# Patient Record
Sex: MALE | Race: WHITE | NOT HISPANIC OR LATINO | URBAN - METROPOLITAN AREA
[De-identification: names, ages, dates, MRNs, and addresses within clinical notes are randomized per-mention and may not be internally consistent; named-entity substitution may affect disease eponyms.]

---

## 2017-11-17 ENCOUNTER — IMPORTED ENCOUNTER (OUTPATIENT)
Dept: URBAN - METROPOLITAN AREA CLINIC 50 | Facility: CLINIC | Age: 72
End: 2017-11-17

## 2017-11-27 ENCOUNTER — IMPORTED ENCOUNTER (OUTPATIENT)
Dept: URBAN - METROPOLITAN AREA CLINIC 50 | Facility: CLINIC | Age: 72
End: 2017-11-27

## 2018-12-03 ENCOUNTER — IMPORTED ENCOUNTER (OUTPATIENT)
Dept: URBAN - METROPOLITAN AREA CLINIC 50 | Facility: CLINIC | Age: 73
End: 2018-12-03

## 2018-12-27 ENCOUNTER — IMPORTED ENCOUNTER (OUTPATIENT)
Dept: URBAN - METROPOLITAN AREA CLINIC 50 | Facility: CLINIC | Age: 73
End: 2018-12-27

## 2018-12-28 ENCOUNTER — IMPORTED ENCOUNTER (OUTPATIENT)
Dept: URBAN - METROPOLITAN AREA CLINIC 50 | Facility: CLINIC | Age: 73
End: 2018-12-28

## 2019-01-02 ENCOUNTER — IMPORTED ENCOUNTER (OUTPATIENT)
Dept: URBAN - METROPOLITAN AREA CLINIC 50 | Facility: CLINIC | Age: 74
End: 2019-01-02

## 2019-01-11 ENCOUNTER — IMPORTED ENCOUNTER (OUTPATIENT)
Dept: URBAN - METROPOLITAN AREA CLINIC 50 | Facility: CLINIC | Age: 74
End: 2019-01-11

## 2019-01-23 ENCOUNTER — IMPORTED ENCOUNTER (OUTPATIENT)
Dept: URBAN - METROPOLITAN AREA CLINIC 50 | Facility: CLINIC | Age: 74
End: 2019-01-23

## 2019-01-23 NOTE — PATIENT DISCUSSION
"""S/P IOL OS: Jaswinder [id number] 21.5 @ 150Âº +Femto +TM/Omidria. Continue post operative instructions and drops per schedule.  """

## 2019-02-01 ENCOUNTER — IMPORTED ENCOUNTER (OUTPATIENT)
Dept: URBAN - METROPOLITAN AREA CLINIC 50 | Facility: CLINIC | Age: 74
End: 2019-02-01

## 2019-02-01 NOTE — PATIENT DISCUSSION
"""S/P IOL OS: Jaswinder [id number] 21.5 @ 150Âº +Femto +TM/Omidria. Continue post operative instructions and drops per schedule.  """

## 2019-02-21 ENCOUNTER — IMPORTED ENCOUNTER (OUTPATIENT)
Dept: URBAN - METROPOLITAN AREA CLINIC 50 | Facility: CLINIC | Age: 74
End: 2019-02-21

## 2019-02-25 ENCOUNTER — IMPORTED ENCOUNTER (OUTPATIENT)
Dept: URBAN - METROPOLITAN AREA CLINIC 50 | Facility: CLINIC | Age: 74
End: 2019-02-25

## 2019-02-25 NOTE — PATIENT DISCUSSION
"""S/P IOL OU: OD: Jaswinder [id number] 21.5 @ 25Âº +ORAFemto +TMOmidria. OS: Jaswinder [id number] 21.5 @ 150Âº +TM. MRx given today. "

## 2019-06-25 ENCOUNTER — APPOINTMENT (RX ONLY)
Dept: URBAN - METROPOLITAN AREA CLINIC 56 | Facility: CLINIC | Age: 74
Setting detail: DERMATOLOGY
End: 2019-06-25

## 2019-06-25 DIAGNOSIS — L81.4 OTHER MELANIN HYPERPIGMENTATION: ICD-10-CM

## 2019-06-25 DIAGNOSIS — M27.0 DEVELOPMENTAL DISORDERS OF JAWS: ICD-10-CM

## 2019-06-25 DIAGNOSIS — D22 MELANOCYTIC NEVI: ICD-10-CM

## 2019-06-25 DIAGNOSIS — D18.0 HEMANGIOMA: ICD-10-CM

## 2019-06-25 DIAGNOSIS — L82.1 OTHER SEBORRHEIC KERATOSIS: ICD-10-CM

## 2019-06-25 DIAGNOSIS — L90.5 SCAR CONDITIONS AND FIBROSIS OF SKIN: ICD-10-CM

## 2019-06-25 DIAGNOSIS — L21.8 OTHER SEBORRHEIC DERMATITIS: ICD-10-CM

## 2019-06-25 PROBLEM — E78.5 HYPERLIPIDEMIA, UNSPECIFIED: Status: ACTIVE | Noted: 2019-06-25

## 2019-06-25 PROBLEM — D22.5 MELANOCYTIC NEVI OF TRUNK: Status: ACTIVE | Noted: 2019-06-25

## 2019-06-25 PROBLEM — D23.72 OTHER BENIGN NEOPLASM OF SKIN OF LEFT LOWER LIMB, INCLUDING HIP: Status: ACTIVE | Noted: 2019-06-25

## 2019-06-25 PROBLEM — D22.71 MELANOCYTIC NEVI OF RIGHT LOWER LIMB, INCLUDING HIP: Status: ACTIVE | Noted: 2019-06-25

## 2019-06-25 PROBLEM — H91.90 UNSPECIFIED HEARING LOSS, UNSPECIFIED EAR: Status: ACTIVE | Noted: 2019-06-25

## 2019-06-25 PROBLEM — D18.01 HEMANGIOMA OF SKIN AND SUBCUTANEOUS TISSUE: Status: ACTIVE | Noted: 2019-06-25

## 2019-06-25 PROBLEM — N40.0 BENIGN PROSTATIC HYPERPLASIA WITHOUT LOWER URINARY TRACT SYMPTOMS: Status: ACTIVE | Noted: 2019-06-25

## 2019-06-25 PROCEDURE — 99214 OFFICE O/P EST MOD 30 MIN: CPT

## 2019-06-25 PROCEDURE — ? COUNSELING

## 2019-06-25 PROCEDURE — ? INVENTORY

## 2019-06-25 PROCEDURE — ? PRESCRIPTION

## 2019-06-25 RX ORDER — KETOCONAZOLE 20 MG/G
CREAM TOPICAL QD
Qty: 1 | Refills: 6 | Status: ERX | COMMUNITY
Start: 2019-06-25

## 2019-06-25 RX ADMIN — KETOCONAZOLE: 20 CREAM TOPICAL at 12:53

## 2019-06-25 ASSESSMENT — LOCATION ZONE DERM
LOCATION ZONE: LEG
LOCATION ZONE: NOSE
LOCATION ZONE: TRUNK
LOCATION ZONE: FEET
LOCATION ZONE: MUCOUS_MEMBRANE
LOCATION ZONE: FACE

## 2019-06-25 ASSESSMENT — LOCATION SIMPLE DESCRIPTION DERM
LOCATION SIMPLE: LEFT CHEEK
LOCATION SIMPLE: LEFT NOSE
LOCATION SIMPLE: FLOOR OF THE MOUTH
LOCATION SIMPLE: ABDOMEN
LOCATION SIMPLE: RIGHT PLANTAR SURFACE
LOCATION SIMPLE: RIGHT ANKLE
LOCATION SIMPLE: RIGHT UPPER BACK

## 2019-06-25 ASSESSMENT — LOCATION DETAILED DESCRIPTION DERM
LOCATION DETAILED: RIGHT POSTERIOR ANKLE
LOCATION DETAILED: PERIUMBILICAL SKIN
LOCATION DETAILED: LEFT FLOOR OF THE MOUTH
LOCATION DETAILED: LEFT MEDIAL MALAR CHEEK
LOCATION DETAILED: RIGHT MEDIAL PLANTAR MIDFOOT
LOCATION DETAILED: LEFT NASAL ALA
LOCATION DETAILED: RIGHT FLOOR OF THE MOUTH
LOCATION DETAILED: RIGHT INFERIOR MEDIAL UPPER BACK

## 2019-06-25 NOTE — HPI: EVALUATION OF SKIN LESION(S)
Hpi Title: Evaluation of Skin Lesions
How Severe Are Your Spot(S)?: mild
Have Your Spot(S) Been Treated In The Past?: has not been treated
Additional History: Pt. States he has an itchy spot on his right post ankle that was previously bx. But was normal

## 2019-07-12 ENCOUNTER — IMPORTED ENCOUNTER (OUTPATIENT)
Dept: URBAN - METROPOLITAN AREA CLINIC 50 | Facility: CLINIC | Age: 74
End: 2019-07-12

## 2019-07-15 ENCOUNTER — IMPORTED ENCOUNTER (OUTPATIENT)
Dept: URBAN - METROPOLITAN AREA CLINIC 50 | Facility: CLINIC | Age: 74
End: 2019-07-15

## 2019-07-15 PROBLEM — Z96.1: Noted: 2019-01-02

## 2019-07-15 PROBLEM — Z96.1: Noted: 2019-01-23

## 2019-07-15 PROBLEM — Z98.890: Noted: 2019-07-15

## 2019-09-09 ENCOUNTER — IMPORTED ENCOUNTER (OUTPATIENT)
Dept: URBAN - METROPOLITAN AREA CLINIC 50 | Facility: CLINIC | Age: 74
End: 2019-09-09

## 2020-10-09 ENCOUNTER — IMPORTED ENCOUNTER (OUTPATIENT)
Dept: URBAN - METROPOLITAN AREA CLINIC 50 | Facility: CLINIC | Age: 75
End: 2020-10-09

## 2020-10-12 ENCOUNTER — IMPORTED ENCOUNTER (OUTPATIENT)
Dept: URBAN - METROPOLITAN AREA CLINIC 50 | Facility: CLINIC | Age: 75
End: 2020-10-12

## 2020-11-02 NOTE — PATIENT DISCUSSION
PROLIFERATIVE DIABETIC RETINOPATHY OU: NO TREATMENT IS NEEDED TODAY. WILL CONTINUE TO FOLLOW WITH DILATED EXAMS AS SCHEDULED. Jacki Xavier

## 2021-01-18 ENCOUNTER — IMPORTED ENCOUNTER (OUTPATIENT)
Dept: URBAN - METROPOLITAN AREA CLINIC 50 | Facility: CLINIC | Age: 76
End: 2021-01-18

## 2021-02-19 ENCOUNTER — IMPORTED ENCOUNTER (OUTPATIENT)
Dept: URBAN - METROPOLITAN AREA CLINIC 50 | Facility: CLINIC | Age: 76
End: 2021-02-19

## 2021-02-19 NOTE — PATIENT DISCUSSION
"""Discussed blepharitis diagnosis with patient. Educated patient on proper lid hygiene and the use of warm compresses. "" ""Start Artificial tears both eyes four times a day ."" ""Start Ocusoft Plus Lid Scrubs both eyes twice a day . """

## 2021-04-18 ASSESSMENT — TONOMETRY
OS_IOP_MMHG: 12
OD_IOP_MMHG: 13
OD_IOP_MMHG: 14
OS_IOP_MMHG: 12
OS_IOP_MMHG: 13
OS_IOP_MMHG: 16
OD_IOP_MMHG: 13
OS_IOP_MMHG: 16
OD_IOP_MMHG: 15
OS_IOP_MMHG: 14
OD_IOP_MMHG: 12
OS_IOP_MMHG: 14
OS_IOP_MMHG: 15
OS_IOP_MMHG: 12
OD_IOP_MMHG: 11
OD_IOP_MMHG: 19
OS_IOP_MMHG: 10
OS_IOP_MMHG: 13
OD_IOP_MMHG: 11
OD_IOP_MMHG: 14
OS_IOP_MMHG: 14
OS_IOP_MMHG: 14
OD_IOP_MMHG: 14
OD_IOP_MMHG: 13
OD_IOP_MMHG: 14
OS_IOP_MMHG: 12
OS_IOP_MMHG: 10
OD_IOP_MMHG: 15
OD_IOP_MMHG: 14
OD_IOP_MMHG: 11
OD_IOP_MMHG: 15
OD_IOP_MMHG: 12
OD_IOP_MMHG: 12
OD_IOP_MMHG: 14
OD_IOP_MMHG: 20
OS_IOP_MMHG: 14
OS_IOP_MMHG: 15
OS_IOP_MMHG: 14
OS_IOP_MMHG: 15
OD_IOP_MMHG: 13
OD_IOP_MMHG: 16
OD_IOP_MMHG: 12
OS_IOP_MMHG: 12
OD_IOP_MMHG: 14
OS_IOP_MMHG: 14
OS_IOP_MMHG: 12
OS_IOP_MMHG: 12
OS_IOP_MMHG: 14

## 2021-04-18 ASSESSMENT — VISUAL ACUITY
OS_SC: 20/30
OS_CC: J3
OD_OTHER: 20/60. 20/80.
OS_SC: 20/25
OS_OTHER: 20/40. 20/40.
OS_CC: J2@ 17 IN
OD_CC: J3
OS_BAT: 20/40
OS_BAT: 20/50
OD_SC: 20/40
OD_BAT: 20/60
OS_CC: 20/30-
OD_SC: 20/20
OD_PH: 20/30-
OD_OTHER: 20/50. 20/200.
OS_CC: J1
OD_SC: 20/40
OD_SC: 20/40
OD_BAT: 20/60
OS_CC: J1
OS_CC: 20/30
OD_CC: J2
OD_SC: 20/30-
OD_OTHER: 20/60. 20/80.
OS_CC: J3@ 17 IN
OS_CC: J1+
OS_SC: 20/25
OD_SC: 20/30-2
OD_CC: 20/60
OD_PH: 20/40
OS_OTHER: 20/40. 20/70.
OS_SC: 20/30+1
OS_SC: 20/25
OS_SC: 20/30-2
OD_CC: J1
OS_SC: 20/40
OD_CC: J1
OD_CC: 20/40-
OD_CC: J1+
OS_BAT: 20/25-
OD_BAT: 20/50
OD_CC: J3@ 17 IN
OD_CC: J2
OS_OTHER: 20/25-. 20/30-.
OD_BAT: 20/50-
OD_SC: 20/20-
OS_CC: J2
OS_OTHER: 20/40. 20/40.
OD_CC: 20/70
OS_CC: J2
OS_PH: @ 17 IN
OD_SC: 20/30
OS_OTHER: 20/50. 20/200.
OD_PH: 20/25
OS_SC: 20/30
OS_SC: 20/25-2
OS_CC: 20/100
OS_PH: 20/25
OD_CC: J2@ 17 IN
OD_PH: @ 17 IN
OS_BAT: 20/40
OS_BAT: 20/40
OD_SC: 20/30
OD_OTHER: 20/50-. 20/70.
OS_CC: 20/40

## 2021-04-18 ASSESSMENT — PACHYMETRY
OS_CT_UM: 579
OS_CT_UM: 579
OD_CT_UM: 574
OD_CT_UM: 574
OS_CT_UM: 579
OS_CT_UM: 579
OD_CT_UM: 574
OS_CT_UM: 579
OD_CT_UM: 574
OD_CT_UM: 574
OS_CT_UM: 579
OS_CT_UM: 579
OD_CT_UM: 574
OS_CT_UM: 579
OD_CT_UM: 574
OS_CT_UM: 579
OS_CT_UM: 579
OD_CT_UM: 574
OD_CT_UM: 574
OS_CT_UM: 579
OD_CT_UM: 574
OD_CT_UM: 574
OS_CT_UM: 579

## 2021-11-03 NOTE — PATIENT DISCUSSION
In depth discussion of the risk of permanent blindness if not compliant with diabetic treatment including medications, monitoring/controlling blood sugars and regular follow up exams.

## 2021-12-21 ENCOUNTER — PREPPED CHART (OUTPATIENT)
Dept: URBAN - METROPOLITAN AREA CLINIC 53 | Facility: CLINIC | Age: 76
End: 2021-12-21

## 2021-12-21 NOTE — PATIENT DISCUSSION
"""Discussed blepharitis diagnosis with patient. Educated patient on proper lid hygiene and the use of warm compresses. "" ""Start Artificial tears both eyes four times a day ."" ""Start Ocusoft Plus Lid Scrubs both eyes twice a day . ""."

## 2021-12-27 ENCOUNTER — COMPREHENSIVE EXAM (OUTPATIENT)
Dept: URBAN - METROPOLITAN AREA CLINIC 53 | Facility: CLINIC | Age: 76
End: 2021-12-27

## 2021-12-27 DIAGNOSIS — H43.813: ICD-10-CM

## 2021-12-27 DIAGNOSIS — H40.013: ICD-10-CM

## 2021-12-27 PROCEDURE — 92014 COMPRE OPH EXAM EST PT 1/>: CPT

## 2021-12-27 PROCEDURE — 92015 DETERMINE REFRACTIVE STATE: CPT

## 2021-12-27 ASSESSMENT — TONOMETRY
OS_IOP_MMHG: 12
OS_IOP_MMHG: 14
OD_IOP_MMHG: 14
OD_IOP_MMHG: 13

## 2021-12-27 ASSESSMENT — VISUAL ACUITY
OU_CC: J2
OD_CC: 20/70
OS_CC: 20/30

## 2022-04-21 ENCOUNTER — DIAGNOSTICS ONLY (OUTPATIENT)
Dept: URBAN - METROPOLITAN AREA CLINIC 53 | Facility: CLINIC | Age: 77
End: 2022-04-21

## 2022-04-21 DIAGNOSIS — H40.013: ICD-10-CM

## 2022-04-21 PROCEDURE — 92083 EXTENDED VISUAL FIELD XM: CPT

## 2022-04-21 PROCEDURE — 92133 CPTRZD OPH DX IMG PST SGM ON: CPT

## 2022-12-27 ENCOUNTER — COMPREHENSIVE EXAM (OUTPATIENT)
Dept: URBAN - METROPOLITAN AREA CLINIC 53 | Facility: CLINIC | Age: 77
End: 2022-12-27

## 2022-12-27 PROCEDURE — 92014 COMPRE OPH EXAM EST PT 1/>: CPT

## 2022-12-27 ASSESSMENT — VISUAL ACUITY
OU_CC: J1
OD_PH: 20/25
OD_CC: 20/30-1
OS_CC: 20/25

## 2022-12-27 ASSESSMENT — TONOMETRY
OS_IOP_MMHG: 14
OD_IOP_MMHG: 13

## 2022-12-27 NOTE — PATIENT DISCUSSION
Patient has intermittent rare double vision when looking to the left x 1 year when focusing on the computer and then looking away. Explained that if worsens RTC for motility but that the prism would only correct for when looking straight ahead.

## 2023-07-19 ENCOUNTER — FOLLOW UP (OUTPATIENT)
Dept: URBAN - METROPOLITAN AREA CLINIC 53 | Facility: CLINIC | Age: 78
End: 2023-07-19

## 2023-07-19 DIAGNOSIS — H40.013: ICD-10-CM

## 2023-07-19 PROCEDURE — 92133 CPTRZD OPH DX IMG PST SGM ON: CPT

## 2023-07-19 PROCEDURE — 92083 EXTENDED VISUAL FIELD XM: CPT

## 2023-07-19 PROCEDURE — 92012 INTRM OPH EXAM EST PATIENT: CPT

## 2023-07-19 ASSESSMENT — VISUAL ACUITY
OU_CC: 20/30-1
OD_CC: 20/30-1
OS_PH: 20/30
OS_CC: 20/40-2

## 2023-07-19 ASSESSMENT — TONOMETRY
OS_IOP_MMHG: 15
OD_IOP_MMHG: 15
OS_IOP_MMHG: 13
OD_IOP_MMHG: 16

## 2024-01-23 ENCOUNTER — COMPREHENSIVE EXAM (OUTPATIENT)
Dept: URBAN - METROPOLITAN AREA CLINIC 53 | Facility: CLINIC | Age: 79
End: 2024-01-23

## 2024-01-23 DIAGNOSIS — H40.013: ICD-10-CM

## 2024-01-23 DIAGNOSIS — H16.223: ICD-10-CM

## 2024-01-23 DIAGNOSIS — H43.813: ICD-10-CM

## 2024-01-23 DIAGNOSIS — H52.4: ICD-10-CM

## 2024-01-23 PROCEDURE — 92015 DETERMINE REFRACTIVE STATE: CPT

## 2024-01-23 PROCEDURE — 92134 CPTRZ OPH DX IMG PST SGM RTA: CPT

## 2024-01-23 PROCEDURE — 99214 OFFICE O/P EST MOD 30 MIN: CPT

## 2024-01-23 ASSESSMENT — KERATOMETRY
OD_AXISANGLE_DEGREES: 117
OD_AXISANGLE2_DEGREES: 27
OS_K2POWER_DIOPTERS: 49.25
OD_K1POWER_DIOPTERS: 46.75
OD_K2POWER_DIOPTERS: 49.75
OS_K1POWER_DIOPTERS: 46.50
OS_AXISANGLE_DEGREES: 056
OS_AXISANGLE2_DEGREES: 146

## 2024-01-23 ASSESSMENT — TONOMETRY
OS_IOP_MMHG: 16
OS_IOP_MMHG: 14
OD_IOP_MMHG: 15
OD_IOP_MMHG: 16

## 2024-01-23 ASSESSMENT — VISUAL ACUITY
OS_SC: 20/30
OU_CC: J1@14"
OD_SC: 20/30-2

## 2024-02-27 ENCOUNTER — CONTACT LENSES/GLASSES VISIT (OUTPATIENT)
Dept: URBAN - METROPOLITAN AREA CLINIC 49 | Facility: LOCATION | Age: 79
End: 2024-02-27

## 2024-02-27 DIAGNOSIS — H53.2: ICD-10-CM

## 2024-02-27 PROCEDURE — 92060 SENSORIMOTOR EXAMINATION: CPT

## 2024-02-27 ASSESSMENT — KERATOMETRY
OD_K2POWER_DIOPTERS: 49.75
OS_AXISANGLE_DEGREES: 056
OD_AXISANGLE2_DEGREES: 27
OD_K1POWER_DIOPTERS: 46.75
OS_K1POWER_DIOPTERS: 46.50
OD_AXISANGLE_DEGREES: 117
OS_K2POWER_DIOPTERS: 49.25
OS_AXISANGLE2_DEGREES: 146

## 2024-02-27 ASSESSMENT — VISUAL ACUITY
OD_CC: 20/25
OU_CC: 20/25
OU_CC: J1 @14"
OS_CC: 20/25

## 2024-07-25 ENCOUNTER — DIAGNOSTICS ONLY (OUTPATIENT)
Dept: URBAN - METROPOLITAN AREA CLINIC 49 | Facility: LOCATION | Age: 79
End: 2024-07-25

## 2024-07-25 DIAGNOSIS — H40.013: ICD-10-CM

## 2024-07-25 PROCEDURE — 92083 EXTENDED VISUAL FIELD XM: CPT

## 2024-07-25 PROCEDURE — 92133 CPTRZD OPH DX IMG PST SGM ON: CPT

## 2024-07-25 ASSESSMENT — KERATOMETRY
OD_AXISANGLE_DEGREES: 117
OS_K2POWER_DIOPTERS: 49.25
OD_K1POWER_DIOPTERS: 46.75
OS_K1POWER_DIOPTERS: 46.50
OD_K2POWER_DIOPTERS: 49.75
OS_AXISANGLE2_DEGREES: 146
OD_AXISANGLE2_DEGREES: 27
OS_AXISANGLE_DEGREES: 056

## 2025-02-11 ENCOUNTER — COMPREHENSIVE EXAM (OUTPATIENT)
Age: 80
End: 2025-02-11

## 2025-02-11 DIAGNOSIS — H40.013: ICD-10-CM

## 2025-02-11 PROCEDURE — 92014 COMPRE OPH EXAM EST PT 1/>: CPT
